# Patient Record
Sex: MALE | Race: WHITE | Employment: OTHER | URBAN - METROPOLITAN AREA
[De-identification: names, ages, dates, MRNs, and addresses within clinical notes are randomized per-mention and may not be internally consistent; named-entity substitution may affect disease eponyms.]

---

## 2017-01-01 ENCOUNTER — APPOINTMENT (OUTPATIENT)
Dept: GENERAL RADIOLOGY | Age: 72
DRG: 871 | End: 2017-01-01
Attending: INTERNAL MEDICINE
Payer: MEDICARE

## 2017-01-01 ENCOUNTER — HOSPITAL ENCOUNTER (INPATIENT)
Age: 72
LOS: 1 days | DRG: 871 | End: 2017-09-22
Attending: STUDENT IN AN ORGANIZED HEALTH CARE EDUCATION/TRAINING PROGRAM | Admitting: FAMILY MEDICINE
Payer: MEDICARE

## 2017-01-01 ENCOUNTER — APPOINTMENT (OUTPATIENT)
Dept: GENERAL RADIOLOGY | Age: 72
DRG: 871 | End: 2017-01-01
Attending: STUDENT IN AN ORGANIZED HEALTH CARE EDUCATION/TRAINING PROGRAM
Payer: MEDICARE

## 2017-01-01 ENCOUNTER — APPOINTMENT (OUTPATIENT)
Dept: CT IMAGING | Age: 72
DRG: 871 | End: 2017-01-01
Attending: FAMILY MEDICINE
Payer: MEDICARE

## 2017-01-01 VITALS
HEART RATE: 106 BPM | RESPIRATION RATE: 32 BRPM | BODY MASS INDEX: 23.43 KG/M2 | TEMPERATURE: 99.2 F | SYSTOLIC BLOOD PRESSURE: 93 MMHG | OXYGEN SATURATION: 77 % | DIASTOLIC BLOOD PRESSURE: 54 MMHG | WEIGHT: 167.33 LBS | HEIGHT: 71 IN

## 2017-01-01 DIAGNOSIS — J18.9 PNEUMONIA OF RIGHT LUNG DUE TO INFECTIOUS ORGANISM, UNSPECIFIED PART OF LUNG: Primary | ICD-10-CM

## 2017-01-01 DIAGNOSIS — A41.9 SEPSIS, DUE TO UNSPECIFIED ORGANISM: ICD-10-CM

## 2017-01-01 DIAGNOSIS — J44.1 COPD EXACERBATION (HCC): ICD-10-CM

## 2017-01-01 LAB
ALBUMIN SERPL-MCNC: 3.4 G/DL (ref 3.5–5)
ALBUMIN/GLOB SERPL: 0.8 {RATIO} (ref 1.1–2.2)
ALP SERPL-CCNC: 82 U/L (ref 45–117)
ALT SERPL-CCNC: 18 U/L (ref 12–78)
ANION GAP SERPL CALC-SCNC: 10 MMOL/L (ref 5–15)
ANION GAP SERPL CALC-SCNC: 9 MMOL/L (ref 5–15)
APPEARANCE UR: ABNORMAL
ARTERIAL PATENCY WRIST A: NO
ARTERIAL PATENCY WRIST A: YES
AST SERPL-CCNC: 27 U/L (ref 15–37)
ATRIAL RATE: 78 BPM
BACTERIA URNS QL MICRO: ABNORMAL /HPF
BASE DEFICIT BLD-SCNC: 5 MMOL/L
BASE EXCESS BLD CALC-SCNC: 0 MMOL/L
BASOPHILS # BLD: 0 K/UL (ref 0–0.1)
BASOPHILS NFR BLD: 0 % (ref 0–1)
BDY SITE: ABNORMAL
BDY SITE: NORMAL
BILIRUB SERPL-MCNC: 0.9 MG/DL (ref 0.2–1)
BILIRUB UR QL CFM: NEGATIVE
BNP SERPL-MCNC: 8176 PG/ML (ref 0–125)
BUN SERPL-MCNC: 42 MG/DL (ref 6–20)
BUN SERPL-MCNC: 47 MG/DL (ref 6–20)
BUN/CREAT SERPL: 17 (ref 12–20)
BUN/CREAT SERPL: 22 (ref 12–20)
CALCIUM SERPL-MCNC: 7.5 MG/DL (ref 8.5–10.1)
CALCIUM SERPL-MCNC: 8.6 MG/DL (ref 8.5–10.1)
CALCULATED P AXIS, ECG09: 76 DEGREES
CALCULATED R AXIS, ECG10: 57 DEGREES
CALCULATED T AXIS, ECG11: 82 DEGREES
CHLORIDE SERPL-SCNC: 105 MMOL/L (ref 97–108)
CHLORIDE SERPL-SCNC: 111 MMOL/L (ref 97–108)
CK MB CFR SERPL CALC: 0.5 % (ref 0–2.5)
CK MB SERPL-MCNC: 4.2 NG/ML (ref 5–25)
CK SERPL-CCNC: 816 U/L (ref 39–308)
CO2 SERPL-SCNC: 22 MMOL/L (ref 21–32)
CO2 SERPL-SCNC: 25 MMOL/L (ref 21–32)
COLOR UR: ABNORMAL
CREAT SERPL-MCNC: 2.11 MG/DL (ref 0.7–1.3)
CREAT SERPL-MCNC: 2.47 MG/DL (ref 0.7–1.3)
DIAGNOSIS, 93000: NORMAL
DIFFERENTIAL METHOD BLD: ABNORMAL
DIGOXIN SERPL-MCNC: 0.2 NG/ML (ref 0.9–2)
EOSINOPHIL # BLD: 0 K/UL (ref 0–0.4)
EOSINOPHIL NFR BLD: 0 % (ref 0–7)
EPITH CASTS URNS QL MICRO: ABNORMAL /LPF
ERYTHROCYTE [DISTWIDTH] IN BLOOD BY AUTOMATED COUNT: 16.7 % (ref 11.5–14.5)
ERYTHROCYTE [DISTWIDTH] IN BLOOD BY AUTOMATED COUNT: 16.9 % (ref 11.5–14.5)
FLUAV AG NPH QL IA: NEGATIVE
FLUBV AG NOSE QL IA: NEGATIVE
GAS FLOW.O2 O2 DELIVERY SYS: ABNORMAL L/MIN
GAS FLOW.O2 O2 DELIVERY SYS: NORMAL L/MIN
GAS FLOW.O2 SETTING OXYMISER: 3 L/M
GAS FLOW.O2 SETTING OXYMISER: 3 L/M
GLOBULIN SER CALC-MCNC: 4.3 G/DL (ref 2–4)
GLUCOSE SERPL-MCNC: 117 MG/DL (ref 65–100)
GLUCOSE SERPL-MCNC: 99 MG/DL (ref 65–100)
GLUCOSE UR STRIP.AUTO-MCNC: NEGATIVE MG/DL
HCO3 BLD-SCNC: 21.7 MMOL/L (ref 22–26)
HCO3 BLD-SCNC: 25 MMOL/L (ref 22–26)
HCT VFR BLD AUTO: 31.9 % (ref 36.6–50.3)
HCT VFR BLD AUTO: 35.6 % (ref 36.6–50.3)
HGB BLD-MCNC: 11.2 G/DL (ref 12.1–17)
HGB BLD-MCNC: 9.8 G/DL (ref 12.1–17)
HGB UR QL STRIP: ABNORMAL
INR PPP: 1.2 (ref 0.9–1.1)
KETONES UR QL STRIP.AUTO: NEGATIVE MG/DL
LACTATE SERPL-SCNC: 1.7 MMOL/L (ref 0.4–2)
LACTATE SERPL-SCNC: 2.5 MMOL/L (ref 0.4–2)
LACTATE SERPL-SCNC: 2.7 MMOL/L (ref 0.4–2)
LACTATE SERPL-SCNC: 3.2 MMOL/L (ref 0.4–2)
LEUKOCYTE ESTERASE UR QL STRIP.AUTO: ABNORMAL
LIPASE SERPL-CCNC: 38 U/L (ref 73–393)
LYMPHOCYTES # BLD: 0.6 K/UL (ref 0.8–3.5)
LYMPHOCYTES NFR BLD: 4 % (ref 12–49)
MAGNESIUM SERPL-MCNC: 2.2 MG/DL (ref 1.6–2.4)
MCH RBC QN AUTO: 29 PG (ref 26–34)
MCH RBC QN AUTO: 29.7 PG (ref 26–34)
MCHC RBC AUTO-ENTMCNC: 30.7 G/DL (ref 30–36.5)
MCHC RBC AUTO-ENTMCNC: 31.5 G/DL (ref 30–36.5)
MCV RBC AUTO: 94.4 FL (ref 80–99)
MCV RBC AUTO: 94.4 FL (ref 80–99)
MONOCYTES # BLD: 0.6 K/UL (ref 0–1)
MONOCYTES NFR BLD: 4 % (ref 5–13)
NEUTS SEG # BLD: 12.9 K/UL (ref 1.8–8)
NEUTS SEG NFR BLD: 92 % (ref 32–75)
NITRITE UR QL STRIP.AUTO: POSITIVE
P-R INTERVAL, ECG05: 154 MS
PCO2 BLD: 41.5 MMHG (ref 35–45)
PCO2 BLD: 43.6 MMHG (ref 35–45)
PH BLD: 7.3 [PH] (ref 7.35–7.45)
PH BLD: 7.39 [PH] (ref 7.35–7.45)
PH UR STRIP: 5.5 [PH] (ref 5–8)
PHOSPHATE SERPL-MCNC: 3.4 MG/DL (ref 2.6–4.7)
PLATELET # BLD AUTO: 122 K/UL (ref 150–400)
PLATELET # BLD AUTO: 79 K/UL (ref 150–400)
PO2 BLD: 66 MMHG (ref 80–100)
PO2 BLD: 85 MMHG (ref 80–100)
POTASSIUM SERPL-SCNC: 4.7 MMOL/L (ref 3.5–5.1)
POTASSIUM SERPL-SCNC: 4.9 MMOL/L (ref 3.5–5.1)
PROT SERPL-MCNC: 7.7 G/DL (ref 6.4–8.2)
PROT UR STRIP-MCNC: 100 MG/DL
PROTHROMBIN TIME: 11.9 SEC (ref 9–11.1)
Q-T INTERVAL, ECG07: 364 MS
QRS DURATION, ECG06: 82 MS
QTC CALCULATION (BEZET), ECG08: 414 MS
RBC # BLD AUTO: 3.38 M/UL (ref 4.1–5.7)
RBC # BLD AUTO: 3.77 M/UL (ref 4.1–5.7)
RBC #/AREA URNS HPF: ABNORMAL /HPF (ref 0–5)
RBC MORPH BLD: ABNORMAL
SAO2 % BLD: 90 % (ref 92–97)
SAO2 % BLD: 96 % (ref 92–97)
SODIUM SERPL-SCNC: 140 MMOL/L (ref 136–145)
SODIUM SERPL-SCNC: 142 MMOL/L (ref 136–145)
SP GR UR REFRACTOMETRY: 1.02 (ref 1–1.03)
SPECIMEN TYPE: ABNORMAL
SPECIMEN TYPE: NORMAL
TOTAL RESP. RATE, ITRR: 30
TROPONIN I SERPL-MCNC: 0.05 NG/ML
TROPONIN I SERPL-MCNC: <0.04 NG/ML
UROBILINOGEN UR QL STRIP.AUTO: 0.2 EU/DL (ref 0.2–1)
VENTRICULAR RATE, ECG03: 78 BPM
WBC # BLD AUTO: 14.1 K/UL (ref 4.1–11.1)
WBC # BLD AUTO: 6.6 K/UL (ref 4.1–11.1)
WBC URNS QL MICRO: >100 /HPF (ref 0–4)

## 2017-01-01 PROCEDURE — 94640 AIRWAY INHALATION TREATMENT: CPT

## 2017-01-01 PROCEDURE — 87449 NOS EACH ORGANISM AG IA: CPT | Performed by: FAMILY MEDICINE

## 2017-01-01 PROCEDURE — 74011000250 HC RX REV CODE- 250: Performed by: FAMILY MEDICINE

## 2017-01-01 PROCEDURE — 83605 ASSAY OF LACTIC ACID: CPT | Performed by: FAMILY MEDICINE

## 2017-01-01 PROCEDURE — 85025 COMPLETE CBC W/AUTO DIFF WBC: CPT | Performed by: STUDENT IN AN ORGANIZED HEALTH CARE EDUCATION/TRAINING PROGRAM

## 2017-01-01 PROCEDURE — 82803 BLOOD GASES ANY COMBINATION: CPT

## 2017-01-01 PROCEDURE — 84100 ASSAY OF PHOSPHORUS: CPT | Performed by: STUDENT IN AN ORGANIZED HEALTH CARE EDUCATION/TRAINING PROGRAM

## 2017-01-01 PROCEDURE — 83690 ASSAY OF LIPASE: CPT | Performed by: STUDENT IN AN ORGANIZED HEALTH CARE EDUCATION/TRAINING PROGRAM

## 2017-01-01 PROCEDURE — 80053 COMPREHEN METABOLIC PANEL: CPT | Performed by: STUDENT IN AN ORGANIZED HEALTH CARE EDUCATION/TRAINING PROGRAM

## 2017-01-01 PROCEDURE — 71010 XR CHEST PORT: CPT

## 2017-01-01 PROCEDURE — 74011250637 HC RX REV CODE- 250/637: Performed by: INTERNAL MEDICINE

## 2017-01-01 PROCEDURE — 74011250636 HC RX REV CODE- 250/636: Performed by: FAMILY MEDICINE

## 2017-01-01 PROCEDURE — 74011250636 HC RX REV CODE- 250/636: Performed by: INTERNAL MEDICINE

## 2017-01-01 PROCEDURE — 82550 ASSAY OF CK (CPK): CPT | Performed by: STUDENT IN AN ORGANIZED HEALTH CARE EDUCATION/TRAINING PROGRAM

## 2017-01-01 PROCEDURE — 84484 ASSAY OF TROPONIN QUANT: CPT | Performed by: FAMILY MEDICINE

## 2017-01-01 PROCEDURE — 80162 ASSAY OF DIGOXIN TOTAL: CPT | Performed by: STUDENT IN AN ORGANIZED HEALTH CARE EDUCATION/TRAINING PROGRAM

## 2017-01-01 PROCEDURE — 96361 HYDRATE IV INFUSION ADD-ON: CPT

## 2017-01-01 PROCEDURE — 74011250636 HC RX REV CODE- 250/636: Performed by: STUDENT IN AN ORGANIZED HEALTH CARE EDUCATION/TRAINING PROGRAM

## 2017-01-01 PROCEDURE — 36415 COLL VENOUS BLD VENIPUNCTURE: CPT | Performed by: FAMILY MEDICINE

## 2017-01-01 PROCEDURE — 36415 COLL VENOUS BLD VENIPUNCTURE: CPT | Performed by: STUDENT IN AN ORGANIZED HEALTH CARE EDUCATION/TRAINING PROGRAM

## 2017-01-01 PROCEDURE — 81001 URINALYSIS AUTO W/SCOPE: CPT | Performed by: STUDENT IN AN ORGANIZED HEALTH CARE EDUCATION/TRAINING PROGRAM

## 2017-01-01 PROCEDURE — 77030029684 HC NEB SM VOL KT MONA -A

## 2017-01-01 PROCEDURE — 74011250637 HC RX REV CODE- 250/637

## 2017-01-01 PROCEDURE — 83735 ASSAY OF MAGNESIUM: CPT | Performed by: STUDENT IN AN ORGANIZED HEALTH CARE EDUCATION/TRAINING PROGRAM

## 2017-01-01 PROCEDURE — 65660000000 HC RM CCU STEPDOWN

## 2017-01-01 PROCEDURE — 84484 ASSAY OF TROPONIN QUANT: CPT | Performed by: STUDENT IN AN ORGANIZED HEALTH CARE EDUCATION/TRAINING PROGRAM

## 2017-01-01 PROCEDURE — 85027 COMPLETE CBC AUTOMATED: CPT | Performed by: FAMILY MEDICINE

## 2017-01-01 PROCEDURE — 74011250637 HC RX REV CODE- 250/637: Performed by: FAMILY MEDICINE

## 2017-01-01 PROCEDURE — 87077 CULTURE AEROBIC IDENTIFY: CPT | Performed by: STUDENT IN AN ORGANIZED HEALTH CARE EDUCATION/TRAINING PROGRAM

## 2017-01-01 PROCEDURE — 85610 PROTHROMBIN TIME: CPT | Performed by: STUDENT IN AN ORGANIZED HEALTH CARE EDUCATION/TRAINING PROGRAM

## 2017-01-01 PROCEDURE — 74011250636 HC RX REV CODE- 250/636: Performed by: NURSE PRACTITIONER

## 2017-01-01 PROCEDURE — 51701 INSERT BLADDER CATHETER: CPT

## 2017-01-01 PROCEDURE — 96375 TX/PRO/DX INJ NEW DRUG ADDON: CPT

## 2017-01-01 PROCEDURE — 74176 CT ABD & PELVIS W/O CONTRAST: CPT

## 2017-01-01 PROCEDURE — 83605 ASSAY OF LACTIC ACID: CPT | Performed by: STUDENT IN AN ORGANIZED HEALTH CARE EDUCATION/TRAINING PROGRAM

## 2017-01-01 PROCEDURE — 87804 INFLUENZA ASSAY W/OPTIC: CPT | Performed by: FAMILY MEDICINE

## 2017-01-01 PROCEDURE — 74011000258 HC RX REV CODE- 258: Performed by: FAMILY MEDICINE

## 2017-01-01 PROCEDURE — 36600 WITHDRAWAL OF ARTERIAL BLOOD: CPT

## 2017-01-01 PROCEDURE — 65270000032 HC RM SEMIPRIVATE

## 2017-01-01 PROCEDURE — 74011000258 HC RX REV CODE- 258: Performed by: STUDENT IN AN ORGANIZED HEALTH CARE EDUCATION/TRAINING PROGRAM

## 2017-01-01 PROCEDURE — 80048 BASIC METABOLIC PNL TOTAL CA: CPT | Performed by: FAMILY MEDICINE

## 2017-01-01 PROCEDURE — 87186 SC STD MICRODIL/AGAR DIL: CPT | Performed by: STUDENT IN AN ORGANIZED HEALTH CARE EDUCATION/TRAINING PROGRAM

## 2017-01-01 PROCEDURE — 83880 ASSAY OF NATRIURETIC PEPTIDE: CPT | Performed by: STUDENT IN AN ORGANIZED HEALTH CARE EDUCATION/TRAINING PROGRAM

## 2017-01-01 PROCEDURE — 77010033678 HC OXYGEN DAILY

## 2017-01-01 PROCEDURE — 70450 CT HEAD/BRAIN W/O DYE: CPT

## 2017-01-01 PROCEDURE — 96365 THER/PROPH/DIAG IV INF INIT: CPT

## 2017-01-01 PROCEDURE — 74011250636 HC RX REV CODE- 250/636

## 2017-01-01 PROCEDURE — 99285 EMERGENCY DEPT VISIT HI MDM: CPT

## 2017-01-01 PROCEDURE — 87040 BLOOD CULTURE FOR BACTERIA: CPT | Performed by: STUDENT IN AN ORGANIZED HEALTH CARE EDUCATION/TRAINING PROGRAM

## 2017-01-01 PROCEDURE — 77030011943

## 2017-01-01 PROCEDURE — 82553 CREATINE MB FRACTION: CPT | Performed by: STUDENT IN AN ORGANIZED HEALTH CARE EDUCATION/TRAINING PROGRAM

## 2017-01-01 PROCEDURE — 74011000250 HC RX REV CODE- 250: Performed by: STUDENT IN AN ORGANIZED HEALTH CARE EDUCATION/TRAINING PROGRAM

## 2017-01-01 PROCEDURE — 93005 ELECTROCARDIOGRAM TRACING: CPT

## 2017-01-01 RX ORDER — MORPHINE SULFATE 2 MG/ML
1 INJECTION, SOLUTION INTRAMUSCULAR; INTRAVENOUS
Status: DISCONTINUED | OUTPATIENT
Start: 2017-01-01 | End: 2017-09-23 | Stop reason: HOSPADM

## 2017-01-01 RX ORDER — ACETAMINOPHEN 325 MG/1
650 TABLET ORAL
Status: DISCONTINUED | OUTPATIENT
Start: 2017-01-01 | End: 2017-09-23 | Stop reason: HOSPADM

## 2017-01-01 RX ORDER — POTASSIUM CHLORIDE 750 MG/1
10 TABLET, FILM COATED, EXTENDED RELEASE ORAL DAILY
COMMUNITY

## 2017-01-01 RX ORDER — NIFEDIPINE 60 MG/1
60 TABLET, EXTENDED RELEASE ORAL 2 TIMES DAILY
COMMUNITY

## 2017-01-01 RX ORDER — ACETAMINOPHEN 650 MG/1
SUPPOSITORY RECTAL
Status: COMPLETED
Start: 2017-01-01 | End: 2017-01-01

## 2017-01-01 RX ORDER — IPRATROPIUM BROMIDE AND ALBUTEROL SULFATE 2.5; .5 MG/3ML; MG/3ML
3 SOLUTION RESPIRATORY (INHALATION)
Status: DISCONTINUED | OUTPATIENT
Start: 2017-01-01 | End: 2017-01-01

## 2017-01-01 RX ORDER — SODIUM CHLORIDE 0.9 % (FLUSH) 0.9 %
5-10 SYRINGE (ML) INJECTION AS NEEDED
Status: DISCONTINUED | OUTPATIENT
Start: 2017-01-01 | End: 2017-09-23 | Stop reason: HOSPADM

## 2017-01-01 RX ORDER — BUDESONIDE AND FORMOTEROL FUMARATE DIHYDRATE 160; 4.5 UG/1; UG/1
2 AEROSOL RESPIRATORY (INHALATION) 2 TIMES DAILY
COMMUNITY

## 2017-01-01 RX ORDER — DIGOXIN 250 MCG
0.25 TABLET ORAL DAILY
Status: DISCONTINUED | OUTPATIENT
Start: 2017-01-01 | End: 2017-01-01

## 2017-01-01 RX ORDER — ALBUTEROL SULFATE 90 UG/1
2 AEROSOL, METERED RESPIRATORY (INHALATION)
COMMUNITY

## 2017-01-01 RX ORDER — IPRATROPIUM BROMIDE AND ALBUTEROL SULFATE 2.5; .5 MG/3ML; MG/3ML
3 SOLUTION RESPIRATORY (INHALATION)
COMMUNITY
End: 2017-01-01

## 2017-01-01 RX ORDER — LORAZEPAM 2 MG/ML
1 INJECTION INTRAMUSCULAR
Status: DISCONTINUED | OUTPATIENT
Start: 2017-01-01 | End: 2017-09-23 | Stop reason: HOSPADM

## 2017-01-01 RX ORDER — DIGOXIN 250 MCG
0.25 TABLET ORAL DAILY
COMMUNITY

## 2017-01-01 RX ORDER — ARFORMOTEROL TARTRATE 15 UG/2ML
15 SOLUTION RESPIRATORY (INHALATION)
Status: DISCONTINUED | OUTPATIENT
Start: 2017-01-01 | End: 2017-01-01

## 2017-01-01 RX ORDER — ACETAMINOPHEN 650 MG/1
650 SUPPOSITORY RECTAL
Status: DISCONTINUED | OUTPATIENT
Start: 2017-01-01 | End: 2017-09-23 | Stop reason: HOSPADM

## 2017-01-01 RX ORDER — VANCOMYCIN 1.75 GRAM/500 ML IN 0.9 % SODIUM CHLORIDE INTRAVENOUS
1750 ONCE
Status: COMPLETED | OUTPATIENT
Start: 2017-01-01 | End: 2017-01-01

## 2017-01-01 RX ORDER — CYCLOBENZAPRINE HCL 10 MG
10 TABLET ORAL
COMMUNITY
End: 2017-01-01

## 2017-01-01 RX ORDER — SODIUM CHLORIDE 0.9 % (FLUSH) 0.9 %
5-10 SYRINGE (ML) INJECTION EVERY 8 HOURS
Status: DISCONTINUED | OUTPATIENT
Start: 2017-01-01 | End: 2017-01-01

## 2017-01-01 RX ORDER — ISOSORBIDE DINITRATE 10 MG/1
10 TABLET ORAL 2 TIMES DAILY
COMMUNITY

## 2017-01-01 RX ORDER — SODIUM CHLORIDE 9 MG/ML
100 INJECTION, SOLUTION INTRAVENOUS CONTINUOUS
Status: DISCONTINUED | OUTPATIENT
Start: 2017-01-01 | End: 2017-09-23 | Stop reason: HOSPADM

## 2017-01-01 RX ORDER — MORPHINE SULFATE 2 MG/ML
1 INJECTION, SOLUTION INTRAMUSCULAR; INTRAVENOUS
Status: DISCONTINUED | OUTPATIENT
Start: 2017-01-01 | End: 2017-01-01

## 2017-01-01 RX ORDER — MORPHINE SULFATE 2 MG/ML
2 INJECTION, SOLUTION INTRAMUSCULAR; INTRAVENOUS
Status: DISCONTINUED | OUTPATIENT
Start: 2017-01-01 | End: 2017-01-01

## 2017-01-01 RX ORDER — ACETAMINOPHEN 10 MG/ML
1000 INJECTION, SOLUTION INTRAVENOUS ONCE
Status: COMPLETED | OUTPATIENT
Start: 2017-01-01 | End: 2017-01-01

## 2017-01-01 RX ORDER — ISOSORBIDE DINITRATE 20 MG/1
10 TABLET ORAL 2 TIMES DAILY
Status: DISCONTINUED | OUTPATIENT
Start: 2017-01-01 | End: 2017-01-01

## 2017-01-01 RX ORDER — FAMOTIDINE 20 MG/1
20 TABLET, FILM COATED ORAL 2 TIMES DAILY
COMMUNITY

## 2017-01-01 RX ORDER — BUDESONIDE 0.5 MG/2ML
500 INHALANT ORAL
Status: DISCONTINUED | OUTPATIENT
Start: 2017-01-01 | End: 2017-09-23 | Stop reason: HOSPADM

## 2017-01-01 RX ORDER — IBUPROFEN 200 MG
1 TABLET ORAL DAILY
Status: DISCONTINUED | OUTPATIENT
Start: 2017-01-01 | End: 2017-09-23 | Stop reason: HOSPADM

## 2017-01-01 RX ORDER — DIGOXIN 125 MCG
0.12 TABLET ORAL DAILY
Status: DISCONTINUED | OUTPATIENT
Start: 2017-01-01 | End: 2017-01-01

## 2017-01-01 RX ORDER — LEVOFLOXACIN 5 MG/ML
750 INJECTION, SOLUTION INTRAVENOUS
Status: DISCONTINUED | OUTPATIENT
Start: 2017-09-24 | End: 2017-01-01

## 2017-01-01 RX ORDER — NADOLOL 20 MG/1
20 TABLET ORAL DAILY
COMMUNITY

## 2017-01-01 RX ORDER — LEVOFLOXACIN 5 MG/ML
750 INJECTION, SOLUTION INTRAVENOUS EVERY 24 HOURS
Status: DISCONTINUED | OUTPATIENT
Start: 2017-01-01 | End: 2017-01-01 | Stop reason: SDUPTHER

## 2017-01-01 RX ORDER — MIRTAZAPINE 15 MG/1
15 TABLET, FILM COATED ORAL
COMMUNITY
End: 2017-01-01

## 2017-01-01 RX ORDER — FAMOTIDINE 10 MG/ML
20 INJECTION INTRAVENOUS EVERY 24 HOURS
Status: DISCONTINUED | OUTPATIENT
Start: 2017-09-23 | End: 2017-01-01

## 2017-01-01 RX ORDER — MORPHINE SULFATE 2 MG/ML
INJECTION, SOLUTION INTRAMUSCULAR; INTRAVENOUS
Status: DISPENSED
Start: 2017-01-01 | End: 2017-09-23

## 2017-01-01 RX ORDER — ACETAMINOPHEN 650 MG/1
650 SUPPOSITORY RECTAL
Status: COMPLETED | OUTPATIENT
Start: 2017-01-01 | End: 2017-01-01

## 2017-01-01 RX ORDER — FAMOTIDINE 20 MG/1
20 TABLET, FILM COATED ORAL 2 TIMES DAILY
Status: DISCONTINUED | OUTPATIENT
Start: 2017-01-01 | End: 2017-01-01

## 2017-01-01 RX ADMIN — ISOSORBIDE DINITRATE 10 MG: 10 TABLET ORAL at 09:18

## 2017-01-01 RX ADMIN — Medication 10 ML: at 13:50

## 2017-01-01 RX ADMIN — FAMOTIDINE 20 MG: 20 TABLET ORAL at 20:44

## 2017-01-01 RX ADMIN — Medication 10 ML: at 21:29

## 2017-01-01 RX ADMIN — SODIUM CHLORIDE 100 ML/HR: 900 INJECTION, SOLUTION INTRAVENOUS at 13:19

## 2017-01-01 RX ADMIN — IPRATROPIUM BROMIDE AND ALBUTEROL SULFATE 3 ML: .5; 3 SOLUTION RESPIRATORY (INHALATION) at 03:31

## 2017-01-01 RX ADMIN — SODIUM CHLORIDE 75 ML/HR: 900 INJECTION, SOLUTION INTRAVENOUS at 03:16

## 2017-01-01 RX ADMIN — LEVOFLOXACIN 750 MG: 5 INJECTION, SOLUTION INTRAVENOUS at 13:20

## 2017-01-01 RX ADMIN — ISOSORBIDE DINITRATE 10 MG: 10 TABLET ORAL at 20:43

## 2017-01-01 RX ADMIN — PIPERACILLIN SODIUM,TAZOBACTAM SODIUM 4.5 G: 4; .5 INJECTION, POWDER, FOR SOLUTION INTRAVENOUS at 13:48

## 2017-01-01 RX ADMIN — IPRATROPIUM BROMIDE AND ALBUTEROL SULFATE 3 ML: .5; 3 SOLUTION RESPIRATORY (INHALATION) at 08:27

## 2017-01-01 RX ADMIN — Medication 10 ML: at 20:49

## 2017-01-01 RX ADMIN — ACETAMINOPHEN 650 MG: 325 TABLET, FILM COATED ORAL at 03:15

## 2017-01-01 RX ADMIN — DIGOXIN 0.25 MG: 0.25 TABLET ORAL at 09:18

## 2017-01-01 RX ADMIN — FAMOTIDINE 20 MG: 20 TABLET ORAL at 09:18

## 2017-01-01 RX ADMIN — ACETAMINOPHEN 1000 MG: 10 INJECTION, SOLUTION INTRAVENOUS at 14:58

## 2017-01-01 RX ADMIN — SODIUM CHLORIDE 75 ML/HR: 900 INJECTION, SOLUTION INTRAVENOUS at 21:16

## 2017-01-01 RX ADMIN — BUDESONIDE 500 MCG: 0.5 INHALANT RESPIRATORY (INHALATION) at 20:48

## 2017-01-01 RX ADMIN — IPRATROPIUM BROMIDE AND ALBUTEROL SULFATE 3 ML: .5; 3 SOLUTION RESPIRATORY (INHALATION) at 20:48

## 2017-01-01 RX ADMIN — IPRATROPIUM BROMIDE AND ALBUTEROL SULFATE 3 ML: .5; 3 SOLUTION RESPIRATORY (INHALATION) at 23:28

## 2017-01-01 RX ADMIN — IPRATROPIUM BROMIDE AND ALBUTEROL SULFATE 3 ML: .5; 3 SOLUTION RESPIRATORY (INHALATION) at 16:13

## 2017-01-01 RX ADMIN — VANCOMYCIN HYDROCHLORIDE 1750 MG: 10 INJECTION, POWDER, LYOPHILIZED, FOR SOLUTION INTRAVENOUS at 16:19

## 2017-01-01 RX ADMIN — SODIUM CHLORIDE 1000 ML: 900 INJECTION, SOLUTION INTRAVENOUS at 15:17

## 2017-01-01 RX ADMIN — BUDESONIDE 500 MCG: 0.5 INHALANT RESPIRATORY (INHALATION) at 08:27

## 2017-01-01 RX ADMIN — MORPHINE SULFATE 1 MG: 2 INJECTION, SOLUTION INTRAMUSCULAR; INTRAVENOUS at 17:21

## 2017-01-01 RX ADMIN — IPRATROPIUM BROMIDE AND ALBUTEROL SULFATE 3 ML: .5; 3 SOLUTION RESPIRATORY (INHALATION) at 16:11

## 2017-01-01 RX ADMIN — ALBUTEROL SULFATE 1 DOSE: 2.5 SOLUTION RESPIRATORY (INHALATION) at 13:35

## 2017-01-01 RX ADMIN — IPRATROPIUM BROMIDE AND ALBUTEROL SULFATE 3 ML: .5; 3 SOLUTION RESPIRATORY (INHALATION) at 13:10

## 2017-01-01 RX ADMIN — MORPHINE SULFATE 2 MG: 2 INJECTION, SOLUTION INTRAMUSCULAR; INTRAVENOUS at 10:57

## 2017-01-01 RX ADMIN — CEFEPIME HYDROCHLORIDE 2 G: 2 INJECTION, POWDER, FOR SOLUTION INTRAVENOUS at 13:43

## 2017-01-01 RX ADMIN — ACETAMINOPHEN 650 MG: 650 SUPPOSITORY RECTAL at 13:49

## 2017-01-01 RX ADMIN — PIPERACILLIN SODIUM,TAZOBACTAM SODIUM 4.5 G: 4; .5 INJECTION, POWDER, FOR SOLUTION INTRAVENOUS at 21:19

## 2017-01-01 RX ADMIN — SODIUM CHLORIDE 2178 ML: 900 INJECTION, SOLUTION INTRAVENOUS at 13:43

## 2017-01-01 RX ADMIN — MORPHINE SULFATE 1 MG: 2 INJECTION, SOLUTION INTRAMUSCULAR; INTRAVENOUS at 20:49

## 2017-01-01 RX ADMIN — Medication 10 ML: at 05:40

## 2017-01-01 RX ADMIN — LEVOFLOXACIN 750 MG: 5 INJECTION, SOLUTION INTRAVENOUS at 14:19

## 2017-01-01 RX ADMIN — PIPERACILLIN SODIUM,TAZOBACTAM SODIUM 4.5 G: 4; .5 INJECTION, POWDER, FOR SOLUTION INTRAVENOUS at 05:35

## 2017-09-21 PROBLEM — A41.9 SEPSIS (HCC): Status: ACTIVE | Noted: 2017-01-01

## 2017-09-21 NOTE — PROGRESS NOTES
Admission Medication Reconciliation:    Information obtained from:  Patient/RxQuery    Comments/Recommendations: Updated PTA meds/reviewed patient's allergies. 1)  Used RxQuery as primary reference for medications and it was challenging for patient to answer questions (lethargic, RR 30, and on O2). 2)  Added Symbicort, digoxin, Spiriva handihaler, and albuterol HFA    3)  Removed DuoNeb, Pulmicort Flexhaler, Remeron, and Spiriva Respirmat    4)  Patient just completed a prednisone Taper       Significant PMH/Disease States:   Past Medical History:   Diagnosis Date    Bronchitis     COPD     Emphysema     Heart disease     HTN (hypertension)     Hypertension     Stroke ([facility] [address]     Vertigo      Chief Complaint for this Admission:    Chief Complaint   Patient presents with    Shortness of Breath     Allergies:  Review of patient's allergies indicates no known allergies. Prior to Admission Medications:   Prior to Admission Medications   Prescriptions Last Dose Informant Patient Reported? Taking? NIFEdipine ER (NIFEDICAL XL) 60 mg ER tablet 9/20/2017 at Unknown time  Yes Yes   Sig: Take 60 mg by mouth two (2) times a day. albuterol (PROVENTIL HFA, VENTOLIN HFA, PROAIR HFA) 90 mcg/actuation inhaler   Yes Yes   Sig: Take 2 Puffs by inhalation every four (4) hours as needed for Wheezing or Shortness of Breath. budesonide-formoterol (SYMBICORT) 160-4.5 mcg/actuation HFA inhaler 9/20/2017 at Unknown time  Yes Yes   Sig: Take 2 Puffs by inhalation two (2) times a day. digoxin (LANOXIN) 0.25 mg tablet 9/20/2017 at Unknown time  Yes Yes   Sig: Take 0.25 mg by mouth daily. famotidine (PEPCID) 20 mg tablet 9/20/2017 at Unknown time  Yes Yes   Sig: Take 20 mg by mouth two (2) times a day. isosorbide dinitrate (ISORDIL) 10 mg tablet 9/20/2017 at Unknown time  Yes Yes   Sig: Take 10 mg by mouth two (2) times a day.    nadolol (CORGARD) 20 mg tablet 9/20/2017 at Unknown time  Yes Yes   Sig: Take 20 mg by mouth daily. potassium chloride SR (KLOR-CON 10) 10 mEq tablet 9/20/2017 at Unknown time  Yes Yes   Sig: Take 10 mEq by mouth daily. tiotropium (SPIRIVA WITH HANDIHALER) 18 mcg inhalation capsule 9/20/2017 at Unknown time  Yes Yes   Sig: Take 1 Cap by inhalation daily.       Facility-Administered Medications: None

## 2017-09-21 NOTE — ROUTINE PROCESS
TRANSFER - OUT REPORT:    Verbal report given to Fabi(name) on Lisa Arias  being transferred to [facility]) for routine progression of care       Report consisted of patients Situation, Background, Assessment and   Recommendations(SBAR). Information from the following report(s) SBAR, ED Summary, Procedure Summary, Intake/Output and Recent Results was reviewed with the receiving nurse. Lines:   Peripheral IV 09/21/17 Right Antecubital (Active)       Peripheral IV 09/21/17 Left Antecubital (Active)   Site Assessment Clean, dry, & intact 9/21/2017  1:17 PM   Phlebitis Assessment 0 9/21/2017  1:17 PM   Infiltration Assessment 0 9/21/2017  1:17 PM   Dressing Status Clean, dry, & intact 9/21/2017  1:17 PM        Opportunity for questions and clarification was provided.       Patient transported with:   Monitor  O2 @ 3 liters  Tech

## 2017-09-21 NOTE — PROGRESS NOTES
TRANSFER - IN REPORT:    Verbal report received from Amanda Toscano RN(name) on Dimple Stone  being received from ED(unit) for routine progression of care      Report consisted of patients Situation, Background, Assessment and   Recommendations(SBAR). Information from the following report(s) SBAR, Kardex, Intake/Output, MAR, Recent Results and Cardiac Rhythm NSR was reviewed with the receiving nurse. Opportunity for questions and clarification was provided. Assessment completed upon patients arrival to unit and care assumed. Skin Assessment:  Primary Nurse Tori Michel and Azra Peter RN performed a dual skin assessment on this patient No Impairment noted--Skin is very dry and has some scattered bruising. Tr score is 13    Bedside shift change report given to Georgina Shafer (oncoming nurse) by Ruibn Solomon RN (offgoing nurse). Report included the following information SBAR, Kardex, Intake/Output, MAR, Recent Results and Cardiac Rhythm NSR.

## 2017-09-21 NOTE — H&P
[address] [facility] [address]   HISTORY AND PHYSICAL       Name:  Kaylee Schilling   MR#:  [[id number]]   :  [id number]   Account #:  [de-identified]        Date of Adm:  2017       HISTORY OF PRESENT ILLNESS: The patient is a 63-year-old   gentleman with past medical history of COPD, emphysema,   hypertension, coronary artery disease, bronchitis, vertigo, history of   recent intracranial hemorrhage, admitted at [facility],   history of possible MI with elevated troponin but refused further   intervention, history of chronic kidney disease and arrhythmias,   presents to the hospital complaining of the above-mentioned   symptoms. The patient is somewhat confused and not much history   could be obtained from the patient. 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 The   family reports that the patient recently was admitted to [facility] and then was transferred to [address] [place] [address], per him, for   rehabilitation secondary to intracranial hemorrhage. The patient also   had a possible heart attack last year, per the son. The son reports that   until yesterday night the patient was fine, but then he started   experiencing some shortness of breath associated with cough. The   son also reports that the patient went to see his primary care physician   a couple of days back, but somehow his prescriptions got mixed up   and he was not able to refill his medication and thus did not take all his   medications yesterday. This morning the patient appeared to be short   of breath and EMS was called. The son reports that the patient also   had a cough, which was productive in nature and also was complaining   of some .  The family reports that the patient lives by himself, is on   baseline oxygen at home and take about 3-5 liters of oxygen at   baseline. The patient currently denies any headache, denies blurry   vision or sore throat or trouble swallowing. Denies any chest pain, but   does admit to some abdominal pain and shortness of breath   associated with cough. The patient was found to have a fever on   arrival to the ER. He does admit to some chills. Denies any   neurological deficits, any falls, injuries, hematemesis, melena,   hemoptysis. Again, the patient is a poor historian. PAST MEDICAL HISTORY: See above. HOME MEDICATIONS: Currently the patient is on   1. Nifedipine 60 mg b.i.d.   2. Isordil 10 mg b.i.d.   3. Potassium chloride. 4. Corgard 20 mg daily. 5. Famotidine 20 mg b.i.d.   6. Albuterol p.r.n.   7. Symbicort 2 puffs daily. 8. Tiotropium. 9. Digoxin 0.25 mg daily. SOCIAL HISTORY: The patient is a current everyday smoker, smokes   2 packs per day. The son reports he drinks about 2-6 beers per week. Denies IV drug abuse. Lives at home. REVIEW OF SYSTEMS: All systems were reviewed and were found to   be essentially negative except for the symptoms mentioned above. The patient is a poor historian. ALLERGIES: NO KNOWN DRUG ALLERGIES. FAMILY HISTORY: Was discussed, was found to be noncontributory. PHYSICAL EXAMINATION   VITAL SIGNS: Temperature 102.3, pulse 80, respiratory rate 29, blood   pressure 134/57, fell to systolic of around 82. The patient on arrival   had a pulse oximetry of 85% on room air. GENERAL: Alert x2, awake, somewhat confused, pleasant male,   appears to be stated age. HEENT: Pupils equal and reactive to light. Dry mucous membranes. Tympanic membranes clear. NECK: Supple. CHEST: Coarse breath sounds bilateral lungs. CORONARY S1, S2 were heard. ABDOMEN: Soft, tender to palpation diffusely. No rebound. Mild   guarding. Bowel sounds are hypoactive. EXTREMITIES: No clubbing, no cyanosis, no edema.    NEUROPSYCHIATRIC: Limited exam secondary to the patient noncompliance with the exam. DTR 2+/4. Appears to move all 4   extremities. Strength could not be tested. Cranial nerves could not be   tested. Sensory could not be tested. SKIN: Warm. LABORATORY DATA: White count 14.1, hemoglobin 11.2, hematocrit   35.6, platelets 890,005. Urine shows large amount of nitrites, large   amount of leukocyte esterase, there is greater than 100 WBC and 3+   bacteria. Sodium 140, potassium 4.7, chloride 105, bicarbonate 25,   anion gap 10, glucose 117, BUN 42, creatinine 2.47, calcium 8.6,   bilirubin total 0.9, ALT 18, AST 27, alkaline phosphatase 82, lactic acid   3.2, CK-MB 4.3. Troponin less than 0.04. ABG shows a pH of 7.388,   pCO2 of 41.5, PO2 of 85. IMAGING: X-ray of the chest shows right infrahilar airspace disease. EKG shows normal sinus rhythm with nonspecific ST changes. ASSESSMENT AND PLAN   1. Sepsis, most likely secondary to pneumonia and/or urinary tract   infection (UTI). The patient will be admitted to the intermediate care   unit. We will start the patient on broad-spectrum IV antibiotic, covering   for hospital-acquired pneumonia. The patient was recently admitted to   the hospital. We will start the patient on IV fluids. The patient received   IV fluids per sepsis protocol in the ER. I am not sure if the patient has a   history of volume overload or not, but will carefully monitor for that,   start the patient on maintenance fluids. We will repeat lactic acid level   in 4 hours. Blood cultures and urine culture has been drawn. We will   provide oxygen support, neurovascular checks and close monitoring. Further intervention will be per hospital course. We will reassess as   needed. Continue to closely monitor for sepsis protocol. 2. Pneumonia. The patient will be treated for hospital-acquired   pneumonia. Broad-spectrum IV antibiotics. Streptococcus pneumoniae   and Legionella antigen has been requested.  [place], oxygen   support, continuous pulse oximetry monitoring. Blood cultures have   been drawn. We will continue to closely monitor. Further intervention   will be per hospital course. We will gently rehydrate and reassess as   needed. 3. Urinary tract infection. Start the patient on IV antibiotics. Urine   culture has been sent and will continue to closely monitor. Further   intervention will be per hospital course. 4. Confusion, most likely secondary to metabolic encephalopathy due   to above. We will get a CT of the head to rule out any acute pathology. We will provide neurovascular checks and provide management of the   above-mentioned conditions. We will provide gentle IV hydration and   further intervention will be per hospital course. If symptoms persist,   may consider getting further imaging and diagnostics. We will reassess   as needed. 5. Acute on chronic renal failure, appears to be prerenal. We will start   the patient on gentle IV hydration. We will hold nephrotoxic medication. We will renally dose all other medications and continue to closely   monitor. May consider further imaging including renal imaging if   elevation of creatinine persists. We will continue to closely monitor. 6. History of chronic obstructive pulmonary disease (COPD). The   patient is on DuoNebs. He does not appear to be in COPD   exacerbation at this point of time. We will continue to closely monitor. Further intervention will be per hospital course. 7. Hypertension. Currently the patient is hypertensive and thus we will   hold all oral antihypertensives for now. We will continue to monitor and   may consider restarting it once blood pressure is stable. 8. Gastrointestinal and deep venous thrombosis prophylaxis. The   patient will be on sequential compression devices.         MD Maya Montiel / Frederick Esqueda   D:  09/21/2017   15:56   T:  09/21/2017   17:02   Job #:  [id number]

## 2017-09-21 NOTE — ED NOTES
Pt cleaned and changed for urinary incontinence. New brief placed and pt repositioned into position of comfort. Head of bed elevated, urine sample obtained and rectal tylenol administered.

## 2017-09-21 NOTE — ED PROVIDER NOTES
HPI Comments: 81 y.o. male with past medical history significant for COPD, emphysema, HTN, heart disease, bronchitis, and vertigo who presents via EMS from home with chief complaint of SOB. Per EMS, pt's family is concerned because pt has been unable to walk since midnight. Pt himself complains of SOB, worsening productive cough, and some abdominal pain to palpation. Pt states symptoms onset 3 days ago. Pt states he in on 3-5L O2 at baseline. Pt arrives with multiple empty prescription bottles and admits to noncompliance, but is unable to verbalize how long he has been without his medications. Pt states he lives at home alone and his ex-wife \"checks on him\" occasionally. Pt specifically denies chest pain. There are no other acute medical concerns at this time. Social hx: every day 2 ppd tobacco smoker; social EtOH use; denies illicit drug use  PCP: Serge Rogel MD    Note written by Tremayne Barry Si, as dictated by Aditya Webster MD 1:20 PM         The history is provided by the patient and the EMS personnel. No  was used. Past Medical History:   Diagnosis Date    Bronchitis     COPD     Emphysema     Heart disease     HTN (hypertension)     Hypertension     Vertigo        Past Surgical History:   Procedure Laterality Date    CARDIAC SURG PROCEDURE UNLIST      Stents placed    HX APPENDECTOMY  8.1.10         History reviewed. No pertinent family history. Social History     Social History    Marital status: SINGLE     Spouse name: N/A    Number of children: N/A    Years of education: N/A     Occupational History    Not on file.      Social History Main Topics    Smoking status: Current Every Day Smoker     Packs/day: 2.00    Smokeless tobacco: Never Used    Alcohol use 3.5 oz/week     7 Cans of beer per week    Drug use: No    Sexual activity: Not on file     Other Topics Concern    Not on file     Social History Narrative         ALLERGIES: Review of patient's allergies indicates no known allergies. Review of Systems   Respiratory: Positive for cough and shortness of breath. Cardiovascular: Negative for chest pain. Gastrointestinal: Positive for abdominal pain. All other systems reviewed and are negative. Vitals:    09/21/17 1257   BP: 120/69   Pulse: 80   Resp: (!) 34   Temp: (!) 102.1 °F (38.9 °C)   SpO2: 100%   Weight: 72.6 kg (160 lb)   Height: 5' 11\" (1.803 m)            Physical Exam   Constitutional: He is oriented to person, place, and time. He appears well-developed. He appears ill (chronically). He appears distressed (mild). Disheveled    HENT:   Head: Normocephalic and atraumatic. Mouth/Throat: Mucous membranes are dry. Eyes: Conjunctivae and EOM are normal.   Neck: Normal range of motion. Neck supple. Cardiovascular: Normal rate, regular rhythm and normal heart sounds. No murmur heard. Pulmonary/Chest: Effort normal. Tachypnea noted. No respiratory distress. He has rhonchi in the right upper field, the right middle field, the right lower field, the left upper field, the left middle field and the left lower field. He has rales in the right lower field and the left lower field. Abdominal: Soft. Bowel sounds are normal. He exhibits no distension. There is no tenderness. There is no rebound. Musculoskeletal: Normal range of motion. He exhibits no edema or tenderness. Neurological: He is alert and oriented to person, place, and time. No cranial nerve deficit. He exhibits normal muscle tone. Coordination normal.   Skin: Skin is warm and dry. Nursing note and vitals reviewed.   Note written by Tremayne Barry Si, as dictated by Aditya Webster MD 1:20 PM     MDM  Number of Diagnoses or Management Options  COPD exacerbation [facility]): new and requires workup  Pneumonia of right lung due to infectious organism, unspecified part of lung: new and requires workup  Sepsis, due to unspecified organism [facility]): new and requires workup Amount and/or Complexity of Data Reviewed  Clinical lab tests: ordered and reviewed  Tests in the radiology section of CPT®: ordered and reviewed  Tests in the medicine section of CPT®: ordered and reviewed  Decide to obtain previous medical records or to obtain history from someone other than the patient: yes  Review and summarize past medical records: yes  Discuss the patient with other providers: yes  Independent visualization of images, tracings, or specimens: yes    Risk of Complications, Morbidity, and/or Mortality  Presenting problems: high  Diagnostic procedures: high  Management options: high    Critical Care  Total time providing critical care: 30-74 minutes (Total critical care time spend exclusive of procedures:  30 minutes.  )    ED Course       Procedures    ED EKG interpretation:  Rhythm: normal sinus rhythm. Rate (approx.): 78; Axis: normal; ST/T wave: normal. No STEMI. No ischemia. Interpretation somewhat limited by artifact. Note written by Tremayne Avila, as dictated by Riki Coburn MD 2:00 PM    Chest X-ray: Impression: Right infrahilar airspace disease. CONSULT NOTE:  2:23 PM Riki Coburn MD spoke with Dr. Papo Bartlett, Consult for Hospitalist.  Discussed available diagnostic tests and clinical findings. He is in agreement with care plans as outlined. Dr. Papo Bartlett will evaluate and admit the patient. 2:22 PM  Discussed available lab and imaging results with patient. He verbalizes understanding and agrees with care plan. Will admit patient to the hospitalist service for further treatment and evaluation.     Recent Results (from the past 24 hour(s))   LIPASE    Collection Time: 09/21/17  1:01 PM   Result Value Ref Range    Lipase 38 (L) 73 - 393 U/L   NT-PRO BNP    Collection Time: 09/21/17  1:01 PM   Result Value Ref Range    NT pro-BNP 8176 (H) 0 - 125 PG/ML   EKG, 12 LEAD, INITIAL    Collection Time: 09/21/17  1:02 PM   Result Value Ref Range    Ventricular Rate 78 BPM    Atrial Rate 78 BPM    P-R Interval 154 ms    QRS Duration 82 ms    Q-T Interval 364 ms    QTC Calculation (Bezet) 414 ms    Calculated P Axis 76 degrees    Calculated R Axis 57 degrees    Calculated T Axis 82 degrees    Diagnosis       Normal sinus rhythm  When compared with ECG of 10-MAR-2016 04:28,  ST no longer depressed in Anterior leads  T wave inversion no longer evident in Inferior leads  T wave inversion no longer evident in Anterolateral leads     CBC WITH AUTOMATED DIFF    Collection Time: 09/21/17  1:04 PM   Result Value Ref Range    WBC 14.1 (H) 4.1 - 11.1 K/uL    RBC 3.77 (L) 4.10 - 5.70 M/uL    HGB 11.2 (L) 12.1 - 17.0 g/dL    HCT 35.6 (L) 36.6 - 50.3 %    MCV 94.4 80.0 - 99.0 FL    MCH 29.7 26.0 - 34.0 PG    MCHC 31.5 30.0 - 36.5 g/dL    RDW 16.7 (H) 11.5 - 14.5 %    PLATELET 720 (L) 223 - 400 K/uL    NEUTROPHILS 92 (H) 32 - 75 %    LYMPHOCYTES 4 (L) 12 - 49 %    MONOCYTES 4 (L) 5 - 13 %    EOSINOPHILS 0 0 - 7 %    BASOPHILS 0 0 - 1 %    ABS. NEUTROPHILS 12.9 (H) 1.8 - 8.0 K/UL    ABS. LYMPHOCYTES 0.6 (L) 0.8 - 3.5 K/UL    ABS. MONOCYTES 0.6 0.0 - 1.0 K/UL    ABS. EOSINOPHILS 0.0 0.0 - 0.4 K/UL    ABS. BASOPHILS 0.0 0.0 - 0.1 K/UL    DF SMEAR SCANNED      RBC COMMENTS ANISOCYTOSIS  1+       METABOLIC PANEL, COMPREHENSIVE    Collection Time: 09/21/17  1:04 PM   Result Value Ref Range    Sodium 140 136 - 145 mmol/L    Potassium 4.7 3.5 - 5.1 mmol/L    Chloride 105 97 - 108 mmol/L    CO2 25 21 - 32 mmol/L    Anion gap 10 5 - 15 mmol/L    Glucose 117 (H) 65 - 100 mg/dL    BUN 42 (H) 6 - 20 MG/DL    Creatinine 2.47 (H) 0.70 - 1.30 MG/DL    BUN/Creatinine ratio 17 12 - 20      GFR est AA 32 (L) >60 ml/min/1.73m2    GFR est non-AA 26 (L) >60 ml/min/1.73m2    Calcium 8.6 8.5 - 10.1 MG/DL    Bilirubin, total 0.9 0.2 - 1.0 MG/DL    ALT (SGPT) 18 12 - 78 U/L    AST (SGOT) 27 15 - 37 U/L    Alk.  phosphatase 82 45 - 117 U/L    Protein, total 7.7 6.4 - 8.2 g/dL    Albumin 3.4 (L) 3.5 - 5.0 g/dL    Globulin 4.3 (H) 2.0 - 4.0 g/dL    A-G Ratio 0.8 (L) 1.1 - 2.2     TROPONIN I    Collection Time: 09/21/17  1:04 PM   Result Value Ref Range    Troponin-I, Qt. <0.04 <0.05 ng/mL   CK W/ REFLX CKMB    Collection Time: 09/21/17  1:04 PM   Result Value Ref Range     (H) 39 - 308 U/L   LACTIC ACID    Collection Time: 09/21/17  1:04 PM   Result Value Ref Range    Lactic acid 3.2 (HH) 0.4 - 2.0 MMOL/L   CK-MB,QUANT. Collection Time: 09/21/17  1:04 PM   Result Value Ref Range    CK - MB 4.2 (H) <3.6 NG/ML    CK-MB Index 0.5 0 - 2.5     POC G3 - PUL    Collection Time: 09/21/17  1:48 PM   Result Value Ref Range    pH (POC) 7.388 7.35 - 7.45      pCO2 (POC) 41.5 35.0 - 45.0 MMHG    pO2 (POC) 85 80 - 100 MMHG    HCO3 (POC) 25.0 22 - 26 MMOL/L    sO2 (POC) 96 92 - 97 %    Base excess (POC) 0 mmol/L    Site RIGHT RADIAL      Device: NASAL CANNULA      Flow rate (POC) 3.0 L/M    Allens test (POC) NO      Specimen type (POC) ARTERIAL     URINALYSIS W/ RFLX MICROSCOPIC    Collection Time: 09/21/17  2:01 PM   Result Value Ref Range    Color DARK YELLOW      Appearance TURBID (A) CLEAR      Specific gravity 1.020 1.003 - 1.030      pH (UA) 5.5 5.0 - 8.0      Protein 100 (A) NEG mg/dL    Glucose NEGATIVE  NEG mg/dL    Ketone NEGATIVE  NEG mg/dL    Blood LARGE (A) NEG      Urobilinogen 0.2 0.2 - 1.0 EU/dL    Nitrites POSITIVE (A) NEG      Leukocyte Esterase LARGE (A) NEG     BILIRUBIN, CONFIRM    Collection Time: 09/21/17  2:01 PM   Result Value Ref Range    Bilirubin UA, confirm NEGATIVE  NEG         Xr Chest Port    Result Date: 9/21/2017  EXAM:  XR CHEST PORT INDICATION:  Shortness of breath. COMPARISON:  3/9/2016. FINDINGS:  AP portable views were obtained of the chest.  The heart is top normal to minimally enlarged. The aorta is atherosclerotic. The lungs are hyperinflated. Right infrahilar airspace disease is seen. Minimal degenerative change of the spine is noted. IMPRESSION: Right infrahilar airspace disease.

## 2017-09-21 NOTE — ED TRIAGE NOTES
Pt comes from home where family called because pt has been unable to walk since midnight last night. Per ems pt has been drowsy but orientedx4. Pt is on 3L O2 at home, pt was given one breathing treatment in route. Pt was given 4L O2 by ems to keep sats in the mid 90s. Pt states that he has chest pain and SOB.

## 2017-09-22 NOTE — PROGRESS NOTES
TRANSFER - OUT REPORT:    Verbal report given to Delvin Wilson RN(name) on Jem Arroyo  being transferred to (unit) for change in patient condition(hospice)       Report consisted of patients Situation, Background, Assessment and   Recommendations(SBAR). Information from the following report(s) SBAR was reviewed with the receiving nurse. Lines:   Peripheral IV 09/21/17 Right Antecubital (Active)   Site Assessment Clean, dry, & intact 9/22/2017  4:00 PM   Phlebitis Assessment 0 9/22/2017  4:00 PM   Infiltration Assessment 0 9/22/2017  4:00 PM   Dressing Status Clean, dry, & intact 9/22/2017  4:00 PM   Dressing Type Transparent 9/22/2017  4:00 PM   Hub Color/Line Status Pink;Capped 9/22/2017  4:00 PM   Action Taken Open ports on tubing capped 9/22/2017  4:00 PM   Alcohol Cap Used Yes 9/22/2017  4:00 PM       Peripheral IV 09/21/17 Left Forearm (Active)   Site Assessment Clean, dry, & intact 9/22/2017  4:00 PM   Phlebitis Assessment 0 9/22/2017  4:00 PM   Infiltration Assessment 0 9/22/2017  4:00 PM   Dressing Status Clean, dry, & intact 9/22/2017  4:00 PM   Dressing Type Transparent 9/22/2017  4:00 PM   Hub Color/Line Status Pink; Infusing 9/22/2017  4:00 PM   Action Taken Open ports on tubing capped 9/22/2017  4:00 PM   Alcohol Cap Used Yes 9/22/2017  4:00 PM        Opportunity for questions and clarification was provided.       Patient transported with:   Hooja

## 2017-09-22 NOTE — PROGRESS NOTES
TRANSFER - IN REPORT:    Verbal report received from [facility] (name) on Tia Rob  being received from [facility] (unit) for routine progression of care      Report consisted of patients Situation, Background, Assessment and   Recommendations(SBAR). Information from the following report(s) SBAR was reviewed with the receiving nurse. Opportunity for questions and clarification was provided.

## 2017-09-22 NOTE — CDMP QUERY
1. There is noted documentation of Acute on Chronic Renal failure for this patient, could this be further specified as:     =>ARNULFO on CKD 3 in the setting of Serum CR/GFR  =>Other Explanation of clinical findings  =>Unable to Determine (no explanation of clinical findings)    The medical record reflects the following clinical findings, treatment, and risk factors:    Risk Factors: Sepsis; PMH CKD    Clinical Indicators: Baseline Renal function: CR 1.48 GFR 47 (3/2016)    Treatment: Monitor labs, VS, I/O; IVFs as ordered    Please clarify and document your clinical opinion in the progress notes and discharge summary including the definitive and/or presumptive diagnosis, (suspected or probable), related to the above clinical findings. Please include clinical findings supporting your diagnosis.     Thank you,  Bandar Pritchard, MSN, [place] [address]

## 2017-09-22 NOTE — CDMP QUERY
2. Please clarify if this patient is being treated/managed for:    =>Chronic Hypoxic Respiratory Failure in the setting of COPD/Home O2 use 3- 5 L  =>Other Explanation of clinical findings  =>Unable to Determine (no explanation of clinical findings)    The medical record reflects the following clinical findings, treatment, and risk factors:    Risk Factors: COPD    Clinical Indicators: Home O2 3-5 L     Treatment: Monitor labs, VS, sats, O2 as indicated    Please clarify and document your clinical opinion in the progress notes and discharge summary including the definitive and/or presumptive diagnosis, (suspected or probable), related to the above clinical findings. Please include clinical findings supporting your diagnosis.     Thank you,  Lorenzo Sampson, MSN, [address] [place] [address]

## 2017-09-22 NOTE — PROGRESS NOTES
Pt put on comfort measures only, family very tearful, offered tissues and called the  to come comfort family. They are aware and would like to see the phil. Family wanted to see the hospice nurse tonight to discuss possibly going home as soon possible, phoned hospice nurse to report findings. Yadi Dorantes will call her nurse to see if she can come see patient this evening. The hospice nurse has left for the evening and they will consult the patient first thing in the morning.

## 2017-09-22 NOTE — PROGRESS NOTES
09/22/17 1143   Vital Signs   Temp (!) 100.7 °F (38.2 °C)   Temp Source Axillary   Pulse (Heart Rate) 83   Heart Rate Source Monitor   Cardiac Rhythm NSR   Resp Rate (!) 31   O2 Sat (%) 93 %   Level of Consciousness Alert   BP 95/46   MAP (Calculated) (!) 62   BP 1 Method Automatic   BP 1 Location Right arm   BP Patient Position At rest   MEWS Score 4   pt being treated.  Septic bundle

## 2017-09-22 NOTE — PROGRESS NOTES
Bedside shift change report given to Catracho Christianson RN (oncoming nurse) by Mariela Alex RN (offgoing nurse). Report included the following information SBAR, Kardex, MAR, Accordion, Recent Results and Cardiac Rhythm NSR.

## 2017-09-22 NOTE — PROGRESS NOTES
Problem: Breathing Pattern - Ineffective  Goal: *Absence of hypoxia  Outcome: Progressing Towards Goal  Pt sat above 95% on 3L NC. Encouraging pt to cough and deep breath.

## 2017-09-22 NOTE — CDMP QUERY
4. Please clarify if this patient is being treated/managed for:    =>Lactic acidosis POA in the setting of Serum Lactate 3.5; Sepsis protocol initiated  in ED  =>Other Explanation of clinical findings  =>Unable to Determine (no explanation of clinical findings)    The medical record reflects the following clinical findings, treatment, and risk factors:    Risk Factors: Sepsis POA    Clinical Indicators: 9/21 LA 3.2/2.5    Treatment: Sepsis protocol; Monitor labs, IVFs as ordered, IVABs    Please clarify and document your clinical opinion in the progress notes and discharge summary including the definitive and/or presumptive diagnosis, (suspected or probable), related to the above clinical findings. Please include clinical findings supporting your diagnosis.     Thank you,  Praful Mejia, MSN, [facility] [place] [address]

## 2017-09-22 NOTE — CDMP QUERY
5. Please clarify if this patient is being treated/managed for:    =>Non-compliance with Medications  as noted per ED documentation  =>Other Explanation of clinical findings  =>Unable to Determine (no explanation of clinical findings)    The medical record reflects the following clinical findings, treatment, and risk factors:    Risk Factors:     Clinical Indicators: Per ED note: \" Pt arrives with multiple empty prescription bottles and admits to noncompliance, but is unable to verbalize how long he has been without his medications\"     Treatment: Patient education; compliance when medically stable     Please clarify and document your clinical opinion in the progress notes and discharge summary including the definitive and/or presumptive diagnosis, (suspected or probable), related to the above clinical findings. Please include clinical findings supporting your diagnosis.     Thank you,  Nancy Lamas, MSN, [address] [place] [address]

## 2017-09-22 NOTE — PROGRESS NOTES
Problem: Falls - Risk of  Goal: *Absence of Falls  Document Vivek Fall Risk and appropriate interventions in the flowsheet. Outcome: Progressing Towards Goal  Bed is in the lowest position and wheels are locked, call bell is within reach, bathroom light is on during evening hours, gripper socks are on and patient has been instructed to call out for assistance if needed. As of now, patient is free from falls and will continue to be monitored.             Fall Risk Interventions:        Mentation Interventions: Adequate sleep, hydration, pain control, Door open when patient unattended, More frequent rounding, Reorient patient     Medication Interventions: Assess postural VS orthostatic hypotension, Patient to call before getting OOB, Teach patient to arise slowly     Elimination Interventions: Call light in reach, Patient to call for help with toileting needs, Toileting schedule/hourly rounds, Urinal in reach

## 2017-09-22 NOTE — PROGRESS NOTES
Case discussed with family    The three living children, sons in room    Pt is DNR    Election is comfort care only.      RN advised    Family agree with Hospice and ask specifically for morphine for comfort    Orders for comfort care established 9/22/2017 4:57 p     Zachariah Phillips MD 9/22/2017

## 2017-09-22 NOTE — CDMP QUERY
3. There is noted documentation of : Sepsis for this patient. To more accurately reflect severity of illness, could this be further specified as    =>Severe Sepsis POA in the setting of PNA, UTI ,  ARNULFO and lactic acidosis; Sepsis protocol initiated in ED, IV  =>Other Explanation of clinical findings  =>Unable to Determine (no explanation of clinical findings)    The medical record reflects the following clinical findings, treatment, and risk factors:    Risk Factors: Sepsis ; PNA; UTI    Clinical Indicators: ARNULFO & lactic acidosis POA    Treatment: Sepsis protocol, monitor labs, VS, IVABs as ordered (levaquin, cefepime, vanc)     Please clarify and document your clinical opinion in the progress notes and discharge summary including the definitive and/or presumptive diagnosis, (suspected or probable), related to the above clinical findings. Please include clinical findings supporting your diagnosis.     Thank you,  Lucas Prsaad, MSN, [address] [place] [address]

## 2017-09-22 NOTE — PROGRESS NOTES
Hospitalist Progress Note  Ismael Weeks NP  Office: [phone]  Cell: [phone]      Date of Service:  2017  NAME:  Leny Bell  :  1945  MRN:  [id number]    Admission Summary:   Pt presented to the ED with poor ambulation, SOB, worsening productive cough and some abdominal pain to palpation. Pt was somewhat confused in the ED and did not provide much history. The family reported the pt has mild cognitive impairment, possibly secondary to early dementia but his presenting condition was definitely a change in his mental status. Pt was recently was admitted to [facility] and then was transferred to [facility], per him, for rehabilitation secondary to intracranial hemorrhage. The pt was apparently doing well until the evening before admit when he started experiencing some shortness of breath associated with cough. The am of the patient appeared to be short of breath and EMS was called. The family reports that the patient lives by himself, is on 3.5-5 L baseline oxygen at home. Interval history / Subjective:   Pt in bed, minimally responsive - does weakly grasp on command and said \"yes\" to one question (whether he could hear me). Really not able to provide much information. Assessment & Plan:     Severe Sepsis (leukocytosis, fever, lactic acidosis most likely secondary to pneumonia, bactermia and/or urinary tract infections) (POA):  - pt on broad-spectrum IV antibiotics  - continue with IVF  - lactic acid now normalized  - leukocytosis resolved  - still febrile. Had one dose of IV tylenol   - Blood cultures  With 2/4 bottles GNR  - called lab for urine culture - will order now (has on hold in lab)     Pneumonia, possible HCAP:   - Chest XRay:  consistent with RLL pneumonia and a small pleural effusion. This has worsened when compared with the CT scan of 2017  - continue tx for HCAP, on  Broad-spectrum IV antibiotics.    - Streptococcus pneumoniae and Legionella antigen are pending.   - continue DuoNebs,   - continue oxygen support      Chronic respiratory Failure with hx COPD/emphysema:   - on Home Oxygen, 3.5-5 L NC  - on nebs    Urinary tract infection (POA):  - UA indicates infection - culture requested to be processed (on hold in lab)    Gram Negative Bacteremia (POA):   - 2/4 bottles  - continue with current abx  - repeat cultures in am    Metabolic encephalopathy: due to above. 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 No acute intracranial abnormality  - gentle IV hydration     Acute on chronic renal failure:   - improved with hydration, continue with same    4.3 cm AAA:   - unsure if this is a new diagnosis, pt unable to provide hx  - will get records form [facility]  - monitor VS     Hx Hypertension:   - hypotensive at present, d/c digoxin and isordil (pt not alert enough to take in po)     Hx Tobacco Use:  - 4 ppd  - nicotine patch    Recent ICH treated at [facility]:   - Current CT scan shows     Possible hx of MI    Likely medication non-compliance  - admitted to no-compliance in ED per notes    Code status: DNR  DVT prophylaxis: SCDs  Care Plan discussed with: nurse, some family in room (pts sister and cousin present). Attending had been in earlier in the day. Disposition: to be determined, pending hospital course     Hospital Problems  Date Reviewed: 9/21/2017          Codes Class Noted POA    * (Principal)Sepsis [facility]) ICD-10-CM: A41.9  ICD-9-CM: 038.9, 995.91  9/21/2017 Unknown            Review of Systems:   Unable to obtain - pt not very responsive    Vital Signs:    Last 24hrs VS reviewed since prior progress note.  Most recent are:  Visit Vitals    /59 (BP 1 Location: Right arm, BP Patient Position: At rest;Supine)    Pulse 83    Temp 99.6 °F (37.6 °C)    Resp 28    Ht 5' 11\" (1.803 m)    Wt 75.9 kg (167 lb 5.3 oz)    SpO2 91%    BMI 23.34 kg/m2       Intake/Output Summary (Last 24 hours) at 09/22/17 0749  Last data filed at 09/22/17 0405   Gross per 24 hour   Intake           761.25 ml   Output                0 ml   Net           761.25 ml      Physical Examination:         Constitutional:  Increased RR   ENT:  Oral mucous membranes dry     Resp:  No wheezing. Diminished on R side (posterior). No accessory muscle use. 3 L NC   CV:  Regular rhythm, normal rate, no murmurs    GI:  Soft, non distended, tender to left side. Normoactive bowel sounds    Musculoskeletal:  No edema, warm, 2+ pulses throughout    Neurologic:  Moves all extremities. Not conversant and minimally responsive       Data Review:   Review and/or order of clinical lab test  Review and/or order of tests in the radiology section of CPT  Review and/or order of tests in the medicine section of CPT    Labs:     Recent Labs      09/22/17 0329  09/21/17   1304   WBC  6.6  14.1*   HGB  9.8*  11.2*   HCT  31.9*  35.6*   PLT  79*  122*     Recent Labs      09/22/17 0329  09/21/17   1304   NA  142  140   K  4.9  4.7   CL  111*  105   CO2  22  25   BUN  47*  42*   CREA  2.11*  2.47*   GLU  99  117*   CA  7.5*  8.6   MG   --   2.2   PHOS   --   3.4     Recent Labs      09/21/17   1304  09/21/17   1301   SGOT  27   --    ALT  18   --    AP  82   --    TBILI  0.9   --    TP  7.7   --    ALB  3.4*   --    GLOB  4.3*   --    LPSE   --   38*     Recent Labs      09/21/17   1304   INR  1.2*   PTP  11.9*      No results for input(s): FE, TIBC, PSAT, FERR in the last 72 hours. No results found for: FOL, RBCF   No results for input(s): PH, PCO2, PO2 in the last 72 hours.   Recent Labs      09/22/17 0329  09/21/17   1304   CKNDX   --   0.5   TROIQ  0.05*  <0.04     Lab Results   Component Value Date/Time    Cholesterol, total 205 03/10/2016 02:00 AM    HDL Cholesterol 35 03/10/2016 02:00 AM    LDL, calculated 145.6 03/10/2016 02:00 AM    Triglyceride 122 03/10/2016 02:00 AM    CHOL/HDL Ratio 5.9 03/10/2016 02:00 AM     No results found for: [address]  [facility]  Lab Results   Component Value Date/Time    Color DARK YELLOW 09/21/2017 02:01 PM    Appearance TURBID 09/21/2017 02:01 PM    Specific gravity 1.020 09/21/2017 02:01 PM    Specific gravity 1.020 03/09/2016 02:30 PM    pH (UA) 5.5 09/21/2017 02:01 PM    Protein 100 09/21/2017 02:01 PM    Glucose NEGATIVE  09/21/2017 02:01 PM    Ketone NEGATIVE  09/21/2017 02:01 PM    Bilirubin NEGATIVE  03/09/2016 02:30 PM    Urobilinogen 0.2 09/21/2017 02:01 PM    Nitrites POSITIVE 09/21/2017 02:01 PM    Leukocyte Esterase LARGE 09/21/2017 02:01 PM    Epithelial cells FEW 09/21/2017 02:01 PM    Bacteria 3+ 09/21/2017 02:01 PM    WBC >100 09/21/2017 02:01 PM    RBC 0-5 09/21/2017 02:01 PM     Medications Reviewed:     Current Facility-Administered Medications   Medication Dose Route Frequency    sodium chloride (NS) flush 5-10 mL  5-10 mL IntraVENous PRN    levoFLOXacin (LEVAQUIN) 750 mg in D5W IVPB  750 mg IntraVENous Q24H    digoxin (LANOXIN) tablet 0.25 mg  0.25 mg Oral DAILY    famotidine (PEPCID) tablet 20 mg  20 mg Oral BID    isosorbide dinitrate (ISORDIL) tablet 10 mg  10 mg Oral BID    sodium chloride (NS) flush 5-10 mL  5-10 mL IntraVENous Q8H    sodium chloride (NS) flush 5-10 mL  5-10 mL IntraVENous PRN    sodium chloride (NS) flush 5-10 mL  5-10 mL IntraVENous PRN    piperacillin-tazobactam (ZOSYN) 4.5 g in 0.9% sodium chloride (MBP/ADV) 100 mL  4.5 g IntraVENous Q8H    [START ON 9/23/2017] levoFLOXacin (LEVAQUIN) 750 mg in D5W IVPB  750 mg IntraVENous Q48H    vancomycin (VANCOCIN) 1,000 mg in 0.9% sodium chloride (MBP/ADV) 250 mL  1,000 mg IntraVENous Q24H    0.9% sodium chloride infusion  100 mL/hr IntraVENous CONTINUOUS    acetaminophen (TYLENOL) tablet 650 mg  650 mg Oral Q4H PRN    albuterol-ipratropium (DUO-NEB) 2.5 MG-0.5 MG/3 ML  3 mL Nebulization Q4H RT    VANCOMYCIN INFORMATION NOTE   Other PRN    arformoterol (BROVANA) neb solution 15 mcg  15 mcg Nebulization BID RT    And    budesonide (PULMICORT) 500 mcg/2 ml nebulizer suspension  500 mcg Nebulization BID RT   ______________________________________________________________________  EXPECTED LENGTH OF STAY: - - -  ACTUAL LENGTH OF STAY:          [address],

## 2017-09-22 NOTE — PROGRESS NOTES
Patient listed as not having a primary care physician. Patient's PCP is Dr. Velia Hook with [address] [place] [address] [phone].   Farshad Valdez, Care Management Specialist.

## 2017-09-22 NOTE — PROGRESS NOTES
Renal Dosing/Monitoring  Medication: Famotidine   Current regimen:  20 mg IV every 12 hr  Recent Labs      09/22/17   0329  09/21/17   1304   CREA  2.11*  2.47*   BUN  47*  42*     Estimated CrCl:  35 ml/min  Plan: Change to 20 mg IV  Q 24 hours  per [facility] P&T Committee Protocol with respect to renal function. Pharmacy will continue to monitor patient daily and will make dosage adjustments based upon changing renal function.     Sia Joseph, PharmD, BCPS

## 2017-09-22 NOTE — PROGRESS NOTES
09/22/17 1430   Vitals   Temp 99.2 °F (37.3 °C)   Temp Source Axillary   Pulse (Heart Rate) 78   Resp Rate 24   O2 Sat (%) 97 %   Level of Consciousness Alert   BP 93/52   MAP (Calculated) 66   MEWS Score 3   Pain 1   Pain Scale 1 Numeric (0 - 10)   Pain Intensity 1 5   Patient Stated Pain Goal 0   Pain Reassessment 1 Yes   Pain Location 1 Abdomen   Pain Orientation 1 Anterior   Pain Description 1 Aching   Pain Intervention(s) 1 Medication (see MAR)   pt being treated for sepsis. Continue sepsis protocol.

## 2017-09-23 PROCEDURE — 77010033678 HC OXYGEN DAILY

## 2017-09-23 NOTE — DISCHARGE SUMMARY
Discharge/Death Summary     PATIENT ID: Jean Crenshaw   MRN: [de-identified]   YOB: 1945    DATE OF ADMISSION: 2017 12:48 PM.  DATE OF DEATH:  2017  PRIMARY CARE PROVIDER: none. ATTENDING PHYSICIAN: Rosa Zamarripa [place]    [id number] [facility] COURSE:   Pt presented to the ED with poor ambulation, SOB, worsening productive cough and some abdominal pain to palpation. Pt was somewhat confused in the ED and did not provide much history. The family reported the pt has mild cognitive impairment, possibly secondary to early dementia but his presenting condition was definitely a change in his mental status. Pt was recently was admitted to [facility] and then was transferred to [facility], per him, for rehabilitation secondary to intracranial hemorrhage. The pt was apparently doing well until the evening before admit when he started experiencing some shortness of breath associated with cough. The am of the patient appeared to be short of breath and EMS was called. The family reports that the patient lives by himself, is on 3.5-5 L baseline oxygen at home. Pt did not show signs of improvement from admit to : his respiratory status appeared to worsen, pt breathing in upper 30's and mental status declined to unresponsive to most stimuli. Severe Sepsis: diagnoses of pneumonia, UTI and bacteremia. Treated with abx, fluids and tylenol. Attending MD met with family later afternoon - family indicating they would like to opt for comfort care and wanted Hospice. Attending confirmed pts DNR and orders placed for hospice care. Pt was transferred up to [address] [place] [address] for comfort care and  just 2 hours later. He was pronounced by night time hospitalist at 2134. Hospital Diagnoses      Severe Sepsis (leukocytosis, fever, lactic acidosis most likely secondary to pneumonia, bactermia and/or urinary tract infections) (POA):  - on broad-spectrum IV antibiotics and fluids.    - lactic acid and leukocytosis had normalized  - still febrile and given a dose of IV tylenol      Pneumonia, possible HCAP:   - Chest XRay:  consistent with RLL pneumonia and a small pleural effusion. Worsened when compared with the CT scan of 9/21/2017  - tx for HCAP, on  Broad-spectrum IV antibiotics. - Streptococcus pneumoniae and Legionella antigen also ordered. - on duonebs/oxygen      Chronic respiratory Failure with hx COPD/emphysema:   - used Home Oxygen, 3.5-5 L NC     Urinary tract infection (POA):  - UA indicated infection - culture had been requested to be processed (on hold in lab)     Gram Negative Bacteremia (POA): 2/4 bottles, treated with broad spectrum abx     Metabolic encephalopathy: due to above. - hx of a ICH   - CT scan showed old lacunar infarction in L caudate.  No acute intracranial abnormality     Acute on chronic renal failure: had improved with hydration     4.3 cm AAA:   - unsure if this is a new diagnosis, pt unable to provide hx  - will get records form [facility]  - monitor VS      Hx Hypertension: BP started a trend down.     Hx Tobacco Use: 4 ppd     Recent ICH treated at [facility]    Possible hx of MI     Likely medication non-compliance: admitted to non-compliance in ED per notes     Code status     Full code    xx DNR      CHRONIC MEDICAL DIAGNOSES:  Problem List as of 9/22/2017  Date Reviewed: 9/21/2017          Codes Class Noted - Resolved    * (Principal)Sepsis ([facility].) ICD-10-CM: A41.9  ICD-9-CM: 038.9, 995.91  9/21/2017 - Present        CKD (chronic kidney disease) ICD-10-CM: N18.9  ICD-9-CM: 585.9  3/9/2016 - Present        Elevated troponin ICD-10-CM: R74.8  ICD-9-CM: 790.6  3/9/2016 - Present        Essential hypertension ICD-10-CM: I10  ICD-9-CM: 401.9  3/9/2016 - Present        COPD (chronic obstructive pulmonary disease) ([facility] ICD-10-CM: J44.9  ICD-9-CM: 021  3/9/2016 - Present        COPD with acute exacerbation ([facility] ICD-10-CM: J44.1  ICD-9-CM: 491.21  3/9/2016 - Present        Weakness ICD-10-CM: R53.1  ICD-9-CM: 780.79  3/9/2016 - Present        RESOLVED: Dyspnea ICD-10-CM: R06.00  ICD-9-CM: 786.09  3/9/2016 - 3/9/2016            Signed:   Aj Reddy NP  9/23/2017  12:10 PM

## 2017-09-23 NOTE — PROGRESS NOTES
I was called to examine patient who reportedly  at 21:05 hours. On examination, patient had:  No response to verbal and tactile stimuli. No respiratory effort. Absent heart sounds and pulses. Pupils fixed and dilated. Patient pronounced dead at 21:34 hours.      Travis Cole MD   Hospitalist

## 2017-09-23 NOTE — PROGRESS NOTES
Pt is noted to have . No heart beat, no breathing and no pulse noted. Family is present at bedside and grieving appropriately. Dr. Justina Lynn is notified and Ney Gutierrez is notified.

## 2017-09-23 NOTE — PROGRESS NOTES
..Bedside shift change report given to Mayra Landeros RN (oncoming nurse) by Charlie Allen (offgoing nurse). Report included the following information SBAR, Kardex and MAR. Pt is a DNR and on comfort care. Multiple family members at bedside. Pt is noted to have labored and accessory muscle breathing. Pt is being administered Morphine by Navjot Patel to help with breathing and discomfort.

## 2017-09-23 NOTE — PROGRESS NOTES
Responded to page for pt's death in [facility]. Offered support to multiple family remembers who filled the room. [address] offered pastoral presence affirming the emotions of this family as they expressed grief over patient's death. Family seemed to be grieving appropriately and helping one another to cope with this loss. Pt's family expressed no immediate needs at the time of this visit. [address] assured family of continued prayers and affirmed ongoing support and availability of support. Timothy Mccoy MDiv.  Staff   Please call [phone] to page  if needed

## 2017-09-23 NOTE — PROGRESS NOTES
09/22/17 2030   Vital Signs   Temp 99.2 °F (37.3 °C)   Temp Source Axillary   Pulse (Heart Rate) (!) 106   Resp Rate (!) 32   O2 Sat (%) (!) 77 %   Level of Consciousness (!) Unresponsive   BP 93/54   MAP (Calculated) 67   BP 1 Method Automatic   BP 1 Location Right arm   BP Patient Position At rest   MEWS Score 8     MEWS 8, pt comfort measures.

## 2017-09-24 NOTE — PROGRESS NOTES
Brief:     Additional dx of record  To include:  ·   Chronic Hypoxic Respiratory Failure in the setting of COPD/Home O2 use 3- 5 L  · Acute on chronic resp failure hypoxic and hypercapnic  · ARNULFO on CKD 3 in the setting of Serum CR/GFR  · Non-compliance with Medications  as noted per ED documentation  · Lactic acidosis POA in the setting of Serum Lactate 3.5; Sepsis protocol initiated  in ED  · Severe Sepsis POA in the setting of PNA, UTI ,  ARNUFLO and lactic acidosis;  Sepsis protocol initiated in ED, Eileen Hartmann MD 9/24/2017

## 2017-09-26 LAB
L PNEUMO1 AG UR QL IA: POSITIVE
SPECIMEN SOURCE: NORMAL

## 2017-09-27 LAB
BACTERIA SPEC CULT: ABNORMAL
BACTERIA SPEC CULT: ABNORMAL
SERVICE CMNT-IMP: ABNORMAL
